# Patient Record
Sex: MALE | Race: WHITE
[De-identification: names, ages, dates, MRNs, and addresses within clinical notes are randomized per-mention and may not be internally consistent; named-entity substitution may affect disease eponyms.]

---

## 2020-09-08 ENCOUNTER — HOSPITAL ENCOUNTER (OUTPATIENT)
Dept: HOSPITAL 62 - SC | Age: 80
Discharge: HOME | End: 2020-09-08
Attending: OPHTHALMOLOGY
Payer: MEDICARE

## 2020-09-08 DIAGNOSIS — E11.9: ICD-10-CM

## 2020-09-08 DIAGNOSIS — Z79.84: ICD-10-CM

## 2020-09-08 DIAGNOSIS — Z95.0: ICD-10-CM

## 2020-09-08 DIAGNOSIS — G47.33: ICD-10-CM

## 2020-09-08 DIAGNOSIS — I10: ICD-10-CM

## 2020-09-08 DIAGNOSIS — Z87.891: ICD-10-CM

## 2020-09-08 DIAGNOSIS — H25.812: Primary | ICD-10-CM

## 2020-09-08 DIAGNOSIS — Z98.41: ICD-10-CM

## 2020-09-08 DIAGNOSIS — Z79.82: ICD-10-CM

## 2020-09-08 PROCEDURE — 82962 GLUCOSE BLOOD TEST: CPT

## 2020-09-08 PROCEDURE — 00142 ANES PX ON EYE LENS SURGERY: CPT

## 2020-09-08 PROCEDURE — V2632 POST CHMBR INTRAOCULAR LENS: HCPCS

## 2020-09-08 PROCEDURE — 66984 XCAPSL CTRC RMVL W/O ECP: CPT

## 2020-09-08 RX ADMIN — SODIUM CHONDROITIN SULFATE / SODIUM HYALURONATE ONE EACH: 0.55-0.5 INJECTION INTRAOCULAR at 00:00

## 2020-09-08 RX ADMIN — CYCLOPENTOLATE HYDROCHLORIDE AND PHENYLEPHRINE HYDROCHLORIDE PRN DROP: 2; 10 SOLUTION/ DROPS OPHTHALMIC at 07:48

## 2020-09-08 RX ADMIN — TETRACAINE HYDROCHLORIDE PRN DROP: 5 SOLUTION OPHTHALMIC at 00:00

## 2020-09-08 RX ADMIN — BESIFLOXACIN PRN DROP: 6 SUSPENSION OPHTHALMIC at 00:00

## 2020-09-08 RX ADMIN — CYCLOPENTOLATE HYDROCHLORIDE AND PHENYLEPHRINE HYDROCHLORIDE PRN DROP: 2; 10 SOLUTION/ DROPS OPHTHALMIC at 07:58

## 2020-09-08 RX ADMIN — DORZOLAMIDE HYDROCHLORIDE AND TIMOLOL MALEATE PRN DROP: 20; 5 SOLUTION OPHTHALMIC at 00:00

## 2020-09-08 RX ADMIN — BESIFLOXACIN PRN DROP: 6 SUSPENSION OPHTHALMIC at 08:08

## 2020-09-08 RX ADMIN — Medication ONE ML: at 08:47

## 2020-09-08 RX ADMIN — TETRACAINE HYDROCHLORIDE PRN DROP: 5 SOLUTION OPHTHALMIC at 08:08

## 2020-09-08 RX ADMIN — SODIUM CHONDROITIN SULFATE / SODIUM HYALURONATE ONE EACH: 0.55-0.5 INJECTION INTRAOCULAR at 08:47

## 2020-09-08 RX ADMIN — BESIFLOXACIN PRN DROP: 6 SUSPENSION OPHTHALMIC at 08:59

## 2020-09-08 RX ADMIN — Medication ONE ML: at 00:00

## 2020-09-08 RX ADMIN — TETRACAINE HYDROCHLORIDE PRN DROP: 5 SOLUTION OPHTHALMIC at 07:49

## 2020-09-08 RX ADMIN — TETRACAINE HYDROCHLORIDE PRN DROP: 5 SOLUTION OPHTHALMIC at 08:28

## 2020-09-08 RX ADMIN — DORZOLAMIDE HYDROCHLORIDE AND TIMOLOL MALEATE PRN DROP: 20; 5 SOLUTION OPHTHALMIC at 08:59

## 2020-09-08 RX ADMIN — BESIFLOXACIN PRN DROP: 6 SUSPENSION OPHTHALMIC at 07:48

## 2020-09-08 RX ADMIN — EPINEPHRINE ONE MG: 1 INJECTION, SOLUTION, CONCENTRATE INTRAVENOUS at 00:00

## 2020-09-08 RX ADMIN — TROPICAMIDE PRN DROP: 10 SOLUTION/ DROPS OPHTHALMIC at 07:58

## 2020-09-08 RX ADMIN — TROPICAMIDE PRN DROP: 10 SOLUTION/ DROPS OPHTHALMIC at 07:48

## 2020-09-08 RX ADMIN — CYCLOPENTOLATE HYDROCHLORIDE AND PHENYLEPHRINE HYDROCHLORIDE PRN DROP: 2; 10 SOLUTION/ DROPS OPHTHALMIC at 08:08

## 2020-09-08 RX ADMIN — TROPICAMIDE PRN DROP: 10 SOLUTION/ DROPS OPHTHALMIC at 08:08

## 2020-09-08 RX ADMIN — EPINEPHRINE ONE MG: 1 INJECTION, SOLUTION, CONCENTRATE INTRAVENOUS at 08:47

## 2020-09-09 NOTE — OPERATIVE REPORT
Operative Report[facility]


Operative Report: 





PREOPERATIVE DIAGNOSIS: Nuclear, cortical and posterior subcapsular cataract, 

left eye


POSTOPERATIVE DIAGNOSIS: Nuclear, cortical and posterior subcapsular cataracts, 

left eye


PROCEDURE: Phacoemulsification and posterior chamber intraocular lens implant, 

left eye


PROCEDURE DATE: [September 8, 2020]


SURGEON: Juan Alberto Simms MD Next


ANESTHETIST: [[facility]]


ANESTHESIA: Topical with IV sedation next


COMPLICATIONS: None


TISSUE TO PATHOLOGY: None


ESTIMATED BLOOD LOSS: None


INDICATION FOR SURGERY: [Mr. Cevallos is a 80 year old male] Who presents to 

our clinic complaining of difficulty seeing, to read and drive due to blurry 

vision in both eyes. On examination, she was found to have best corrected visual

acuity of [20/40] in the left eye. Ophthalmoscopy revealed a [+2] nuclear, +2] 

corneal degeneration, [+1] posterior subcapsular cataract in the left eye with 

normal appearing cornea, vitreous, retina and optic nerve. I discussed the 

findings of the exam with the patient. We discussed the risks, benefits and 

alternatives of cataract extraction and intraocular lens implant in the left eye

 as a means of improving her vision. Risks that were discussed with the patient 

include infection, bleeding, retinal detachment and possible need for additional

 surgery. The patient understands that she may need to wear glasses after 

surgery. After discussion, the patient indicated her interest in having this 

procedure performed by signing an informed witness consent form.


REPORT OF PROCEDURE: On the day of surgery, the patient was given a topical 

application to the left eye to consist of drop of Tetracaine 0.5%, tropicamide 

1%, Cyclomidril, Besivance 0.6% and Acular 0.45%. The patient was then taken to 

the operating room in a supine position in a standard eye bed. Intravenous 

sedation was administered and she was prepped and draped in the standard 

fashion. A timeout was performed to confirm the surgical site. Attention was 

directed to the left eye where a paracentesis was created at the 5:30 position 

at the corneal limbus with a 15 degree blade. The anterior chamber was filled 

with 0.3 mL of 1% methylparaben free lidocaine and after 30 seconds the anterior

 chamber was filled with viscoelastic material. A 3 plane corneal incision was 

then made at the 3 o'clock position at the cornea limbus with a keratome. A 

continuous curvilinear capsulorrhexis was then made in the anterior capsule of 

the lens with a cystotome. The lens was hydrodissected using balanced saline 

solution. The lens nucleus was then removed by phacoemulsification using the 

stop and chop technique. CDE [14.82]. The remaining cortical material was then 

removed from the posterior capsular bag using irrigation and aspiration. The 

posterior capsule bag was filled with viscoelastic material and a lens implant 

was inserted into the posterior capsule bag. I have chosen for this case is a 

one piece acrylic lens from [employer] model [[id number]], serial number 

[[id number]], lens power [21.0]. The lens was removed from its package, 

inspected and found to be free of defects it was loaded into a [place] 

. The  was passed through the temporal wound and the lens was 

advanced into the posterior capsular bag. The lens implant was centered in the 

posterior capsular bag with the Mary Ellen spatula the viscoelastic material was 

removed from the eye using irrigation and aspiration. The wounds were closed by 

stromal hydration and they were tested with the Weck-Ruchi sponges and found to 

have no leaks. Intraocular pressure was assessed by manual palpitation found to 

be with in the physiologic range. The drape and speculum were removed. Drops of 

Durezol, Combigan and gatifloxacin were instilled in the left eye. The patient 

was then taken to the recovery room in good condition. The patient tolerated the

 procedure very well. The patient was given a prescription for gatifloxacin, 

Durezol and Ilervo to use every 2 hours while awake today. She will return my 

clinic tomorrow for follow-up evaluation.

## 2020-11-20 LAB
APTT BLD: 44 SEC (ref 23.5–35.8)
ERYTHROCYTE [DISTWIDTH] IN BLOOD BY AUTOMATED COUNT: 16.4 % (ref 11.5–14)
HCT VFR BLD CALC: 32.1 % (ref 37.9–51)
HGB BLD-MCNC: 11 G/DL (ref 13.5–17)
INR PPP: 1.34
MCH RBC QN AUTO: 33.5 PG (ref 27–33.4)
MCHC RBC AUTO-ENTMCNC: 34.1 G/DL (ref 32–36)
MCV RBC AUTO: 98 FL (ref 80–97)
PLATELET # BLD: 168 10^3/UL (ref 150–450)
PROTHROMBIN TIME: 16.8 SEC (ref 11.4–15.4)
RBC # BLD AUTO: 3.28 10^6/UL (ref 4.35–5.55)
WBC # BLD AUTO: 6.2 10^3/UL (ref 4–10.5)

## 2020-11-20 NOTE — EKG REPORT
SEVERITY:- ABNORMAL ECG -

ATRIAL-SENSED VENTRICULAR-PACED COMPLEXES

:

Confirmed by: Sandra Henderson MD 20-Nov-2020 13:27:58

## 2020-11-24 ENCOUNTER — HOSPITAL ENCOUNTER (OUTPATIENT)
Dept: HOSPITAL 62 - OROUT | Age: 80
Discharge: HOME | End: 2020-11-24
Attending: PLASTIC SURGERY
Payer: MEDICARE

## 2020-11-24 VITALS — SYSTOLIC BLOOD PRESSURE: 128 MMHG | DIASTOLIC BLOOD PRESSURE: 69 MMHG

## 2020-11-24 DIAGNOSIS — E66.9: ICD-10-CM

## 2020-11-24 DIAGNOSIS — Z79.4: ICD-10-CM

## 2020-11-24 DIAGNOSIS — I10: ICD-10-CM

## 2020-11-24 DIAGNOSIS — K21.9: ICD-10-CM

## 2020-11-24 DIAGNOSIS — I25.10: ICD-10-CM

## 2020-11-24 DIAGNOSIS — Z20.828: ICD-10-CM

## 2020-11-24 DIAGNOSIS — Z95.5: ICD-10-CM

## 2020-11-24 DIAGNOSIS — Z95.0: ICD-10-CM

## 2020-11-24 DIAGNOSIS — E11.9: ICD-10-CM

## 2020-11-24 DIAGNOSIS — F17.220: ICD-10-CM

## 2020-11-24 DIAGNOSIS — Z79.899: ICD-10-CM

## 2020-11-24 DIAGNOSIS — Z86.03: ICD-10-CM

## 2020-11-24 DIAGNOSIS — Z79.01: ICD-10-CM

## 2020-11-24 DIAGNOSIS — Z79.82: ICD-10-CM

## 2020-11-24 DIAGNOSIS — C44.229: Primary | ICD-10-CM

## 2020-11-24 DIAGNOSIS — L57.0: ICD-10-CM

## 2020-11-24 LAB
APTT BLD: 35.9 SEC (ref 23.5–35.8)
INR PPP: 1.15
PROTHROMBIN TIME: 14.9 SEC (ref 11.4–15.4)

## 2020-11-24 PROCEDURE — 85610 PROTHROMBIN TIME: CPT

## 2020-11-24 PROCEDURE — 85730 THROMBOPLASTIN TIME PARTIAL: CPT

## 2020-11-24 PROCEDURE — 93005 ELECTROCARDIOGRAM TRACING: CPT

## 2020-11-24 PROCEDURE — 88331 PATH CONSLTJ SURG 1 BLK 1SPC: CPT

## 2020-11-24 PROCEDURE — 82947 ASSAY GLUCOSE BLOOD QUANT: CPT

## 2020-11-24 PROCEDURE — 36415 COLL VENOUS BLD VENIPUNCTURE: CPT

## 2020-11-24 PROCEDURE — 87635 SARS-COV-2 COVID-19 AMP PRB: CPT

## 2020-11-24 PROCEDURE — 88305 TISSUE EXAM BY PATHOLOGIST: CPT

## 2020-11-24 PROCEDURE — 14060 TIS TRNFR E/N/E/L 10 SQ CM/<: CPT

## 2020-11-24 PROCEDURE — 85027 COMPLETE CBC AUTOMATED: CPT

## 2020-11-24 PROCEDURE — C9803 HOPD COVID-19 SPEC COLLECT: HCPCS

## 2020-11-24 PROCEDURE — 93010 ELECTROCARDIOGRAM REPORT: CPT

## 2020-11-24 NOTE — OPERATIVE REPORT
Operative Report


DATE OF SURGERY: 11/24/20


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


OPERATION: Excision of basal squamous carcinoma of the left helical rim with 

frozen section margin control and reconstruction with a postauricular 

advancement flap reconstruction


SURGEON: AYESHA GRIFFIN


ANESTHESIA: LMAC


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


COMPLICATIONS: 





None


ESTIMATED BLOOD LOSS: Minimal


PROCEDURE: 





 Patient seen and was marked prior to being brought into the operating room.


Patient was brought into the operating room and placed on the operating room 

table in a sloppy lateral position.


Patient was then prepped with a Betadine scrub and Betadine solution and draped 

in a sterile and aseptic manner.


The area was then marked.


12 O'clock was marked towards the tragus                                        

 


3 O'clock was marked towards the apex of the ear


 6:00 was marked towards the postauricular sulcus


9:00 was marked towards the lobule of the ear


The area was then anesthetized with 1% lidocaine with epinephrine and 

bicarbonate for its anesthetic and hemostatic effects.


The area was then excised and marked at 12:00.


The specimen was sent for frozen section.


The results came back that the deep and lateral margins were free.


We had considered a primary closure but this would go against the natural 

relaxed skin tension lines. 


A primary closure would be too tight and would have increased chance of 

dehiscence.


This will leave more of a scar so we decided to use a postauricular slight 

advancement flap reconstruction which would camouflage the scar better and take 

tension off of the closure so that would be less chances of complications.


Then we went ahead and outlined the flap and anesthetized it.


We then incised the flap and developed a flap maintaining the subdermal plexus.


Details are described as below.


Then we undermined 360 to allow for plate like scarring and minimize trap door 

deformity.


Throughout the case hemostasis was achieved with the bipolar.


Once the flap was mobilized and created this allowed us to be able to slide and 

advance this into the area of defect.


Some cartilage was trimmed as necessary in order to give a tension-free rim 

closure.


It was felt that this flap was the best flap for the patient because he already 

had a previous cancer and had lost some of his helical rim at the most apex area

of the ear.


We were able to tie this reconstruction into the previous resection area.


We were able now to carry the same contour down through most of the helical rim 

down towards the lobule of the ear.


Again before insetting the flap cartilage was trimmed and Burow's triangles were

resected in order to allow the slight advancement of the postauricular flap to 

the anterior surface.


Again we did do undermining of the anterior surface in order to allow for a 

better curvature of the reconstruction.


The flap was then sutured into its new position using 5-0 Prolene sutures.


Final contours of the flap were performed along the superior and inferior aspect

of the reconstruction.


 We then applied tincture benzoin and Steri-Strips followed by a light pressure 

dressing.


Patient was then reversed from anesthesia and taken to the [facility] for recovery. 


The patient tolerated well.  


There were no complications.  


Lesion size was approximately 1.7 cm please see pathology for actual size.


 


Portions of this note may be dictated using Dragon voice recognition software.


Occasional variations and spelling and vocabulary could be possible and are 

unintentional.


Additionally, there is a chance that some errors may not be caught or corrected.


Please notify the author of any discrepancies noted or if any statements are 

unclear.





Subjective: No complaints


Objective:   Vital signs stable afebrile


                   No bleeding


                   Dressing intact


Assessment and plan:


                   Doing well.


                   Elevate the operative site.


                   Resume medications.  


                   Take antibiotics for 1 day


                   Follow-up Thursday


                   Full instructions were given to the patient and family and 

they understand


 


Portions of this note may be dictated using Dragon voice recognition software.


Occasional variations and spelling and vocabulary could be possible and are 

unintentional.


Additionally, there is a chance that some errors may not be caught or corrected.


Please notify the offer of any discrepancies noted or if any statements are 

unclear.

## 2020-11-24 NOTE — DISCHARGE SUMMARY
Discharge Summary (SDC)





- Discharge


Final Diagnosis: 





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


Date of Surgery: 11/24/20


Condition: Good


Forms:  Discharge POC-Surgical Service


Treatment or Instructions: 


 


 


 


 


 


 


 


 


 


Leave the top dressing on for 3 days, then removed.


 


Leave the steri-strip tapes on for 5 days, then removal.


 


Then cleaning wound with peroxide and apply bacitracin 3 times per day.


 


Antibiotics for 1 day, then discontinue.


 


Elevate operative area to decrease swelling.


 


Do not strain, or lift heavy objects.


 


Call for excessive bleeding, increased temperature of 101, uncontrolled pain, 

or excessive nausea or vomiting.


You may reach Dr. Griffin through his office at [phone].


In the event of an emergency after hours, then contact Dr. Griffin through [facility].


 


Return to the office for a postop check on Thursday.  The time will be scheduled

by the nursing staff  of [facility] prior to discharge.


 


 


Please give the patient a copy of their labs and EKG so they can bring this to 

their PMD.


Thank you


 


Portions of this note may be dictated using Dragon voice recognition software.


Occasional variations and spelling and vocabulary could be possible and are 

unintentional.


Additionally, there is a chance that some errors may not be caught or corrected.


Please notify the offer of any discrepancies noted or if any statements are 

unclear.


 


Referrals: 


AYESHA GRIFFIN MD [ACTIVE STAFF] - 11/30/20 1:00 pm


Discharge Diet: As Tolerated


Discharge Activity: Activity As Tolerated, Balance Activity w/Rest, No 

Lifting/Push/Pulling


Home Care Assistance: None Needed


Report the Following to Your Physician Immediately: Shortness of Breath, Nausea,

Vomiting, Increase in Pain, Fever over 101 Degrees, Unusual Bleeding - Keep head

elevated. Do not sleep on the reconstructed ear. Take Eliquis tonight. Resume 

fish oil tomorrow. Continue with the aspirin and all other medications that were

not stopped for the surgery starting today. Follow-up on Monday. yes